# Patient Record
Sex: FEMALE | Race: OTHER | Employment: FULL TIME | URBAN - METROPOLITAN AREA
[De-identification: names, ages, dates, MRNs, and addresses within clinical notes are randomized per-mention and may not be internally consistent; named-entity substitution may affect disease eponyms.]

---

## 2024-08-27 ENCOUNTER — OFFICE VISIT (OUTPATIENT)
Dept: ORTHOPEDICS CLINIC | Facility: CLINIC | Age: 59
End: 2024-08-27

## 2024-08-27 ENCOUNTER — HOSPITAL ENCOUNTER (OUTPATIENT)
Dept: GENERAL RADIOLOGY | Facility: HOSPITAL | Age: 59
Discharge: HOME | End: 2024-08-27
Attending: ORTHOPAEDIC SURGERY
Payer: COMMERCIAL

## 2024-08-27 VITALS — HEIGHT: 61 IN | BODY MASS INDEX: 26.62 KG/M2 | WEIGHT: 141 LBS

## 2024-08-27 DIAGNOSIS — M79.642 LEFT HAND PAIN: ICD-10-CM

## 2024-08-27 DIAGNOSIS — M77.8 TENDINITIS OF EXTENSOR TENDON OF LEFT HAND: Primary | ICD-10-CM

## 2024-08-27 PROCEDURE — 73130 X-RAY EXAM OF HAND: CPT | Performed by: ORTHOPAEDIC SURGERY

## 2024-08-27 PROCEDURE — 3008F BODY MASS INDEX DOCD: CPT | Performed by: ORTHOPAEDIC SURGERY

## 2024-08-27 PROCEDURE — 99204 OFFICE O/P NEW MOD 45 MIN: CPT | Performed by: ORTHOPAEDIC SURGERY

## 2024-08-27 NOTE — H&P
NURSING INTAKE COMMENTS:   Chief Complaint   Patient presents with    Hand Pain     Consult left thumb intermittent shooting pain 5/10, swelling and bruising is decreasing. She fell  a month ago in her yard she trip on the lawn [facility].       HPI: This 59 year old right-hand-dominant female presents today with her  for pain on the extensor tendon of the left thumb just proximal to the MP joint.  She fell in her yard over the lawnmower about a month ago.  She is under the left thumb bent back and it swelled.  Since that time the swelling has resolved but she has these intermittent pains on the dorsum of the thumb metacarpal.  She denies numbness or tingling or instability.  She denies prior problems with the left thumb.    Her medical history is most notable for bilateral mastectomies with radiation and chemotherapy 3 years ago for breast cancer.  So far so good in terms of recurrence.  She works from her desk at home.  She lives with her  who is present today.  No kids in the house.  The house has stairs.  She drives.  She likes to walk on the pericatheter exercise several times a week.  She is a non-smoker and in good shape physically.      Past Medical History:    Hyperlipidemia     Past Surgical History:   Procedure Laterality Date           No current outpatient medications on file.     No Known Allergies  Family History   Problem Relation Age of Onset    Cancer Father         lymphoma    Diabetes Father     Hypertension Mother     Lipids Mother     Other (Other[other]) Mother         memory     No family Hx of DVT/PE    Social History     Occupational History    Occupation:    Tobacco Use    Smoking status: Never    Smokeless tobacco: Never   Substance and Sexual Activity    Alcohol use: Yes     Alcohol/week: 0.0 standard drinks of alcohol     Comment: rare    Drug use: No    Sexual activity: Not on file        Review of Systems:  GENERAL: feels generally well, no significant weight  loss or weight gain  SKIN: no ulcerated or worrisome skin lesions  EYES:denies blurred vision or double vision  HEENT: denies new nasal congestion, sinus pain or ST  LUNGS: denies shortness of breath  CARDIOVASCULAR: denies chest pain  GI: no hematemesis, no worsening heartburn, no diarrhea  : no dysuria, no blood in urine, no difficulty urinating, no incontinence  MUSCULOSKELETAL: no other musculoskeletal complaints other than in HPI  NEURO: no numbness or tingling, no weakness or balance disorder  PSYCHE: no depression or anxiety  HEMATOLOGIC: no hx of blood dyscrasia, no Hx DVT/PE  ENDOCRINE: no thyroid or diabetes issues  ALL/ASTHMA: no new hx of severe allergy or asthma    Physical Examination:    Ht 5' 1\" (1.549 m)   Wt 141 lb (64 kg)   BMI 26.64 kg/m²   Constitutional: appears well hydrated, alert and responsive, no acute distress noted  Extremities: The left thumb appeared benign without swelling or deformity.  Musculoskeletal: Full range of motion all 5 digits including the thumb left hand and symmetric to the right.  No instability or tenderness to the radial or ulnar collateral ligaments of the MP joint.  Thumb grind negative and Finkelstein only mildly positive on the dorsum of the metacarpal but not at the wrist.  The extensor and flexor tendons of the thumb function normally without snapping or popping.  Neurological: Normal motor and sensory left hand and fingers including the left thumb.    Imaging: X-rays of the hand and left thumb are normal including the CMC joint.      No results found.     Lab Results   Component Value Date    WBC 7.99 10/28/2016    HGB 12.2 10/28/2016     10/28/2016      Lab Results   Component Value Date    GLU 91 10/28/2016    BUN 13 10/28/2016    CREATSERUM 0.64 10/28/2016    GFR >59 10/30/2009        Assessment and Plan:  Diagnoses and all orders for this visit:    Tendinitis of extensor tendon of left hand  -     Occupational Therapy Referral - [place]  Locations    Left hand pain  -     XR HAND (MIN 3 VIEWS), LEFT (CPT=73130); Future  -     Occupational Therapy Referral - [place] Locations        Assessment: Mild tendinitis of the extensor tendon left thumb for about 1 month.    Plan: We talked about injection and therapy.  I told her her symptoms are fairly mild and we both agreed we will hold off on injection.  She will do a course of occupational therapy for home exercise program.  If she has not improved and wants to try an injection which she can always return to the office.  We did talk about bleaching from the injection as a risk.  For now however I will see her as needed.  I do not think she needs an MRI.    Follow Up: No follow-ups on file.    Linus Boo MD